# Patient Record
Sex: FEMALE | HISPANIC OR LATINO | Employment: UNEMPLOYED | URBAN - METROPOLITAN AREA
[De-identification: names, ages, dates, MRNs, and addresses within clinical notes are randomized per-mention and may not be internally consistent; named-entity substitution may affect disease eponyms.]

---

## 2018-10-08 ENCOUNTER — HOSPITAL ENCOUNTER (EMERGENCY)
Facility: CLINIC | Age: 38
Discharge: HOME | End: 2018-10-08
Attending: EMERGENCY MEDICINE | Admitting: EMERGENCY MEDICINE
Payer: MEDICAID

## 2018-10-08 VITALS
HEIGHT: 60 IN | TEMPERATURE: 97.9 F | SYSTOLIC BLOOD PRESSURE: 109 MMHG | BODY MASS INDEX: 24.54 KG/M2 | WEIGHT: 125 LBS | DIASTOLIC BLOOD PRESSURE: 69 MMHG | OXYGEN SATURATION: 100 %

## 2018-10-08 DIAGNOSIS — R10.13 ABDOMINAL PAIN, EPIGASTRIC: ICD-10-CM

## 2018-10-08 DIAGNOSIS — K29.00 ACUTE GASTRITIS, PRESENCE OF BLEEDING UNSPECIFIED, UNSPECIFIED GASTRITIS TYPE: ICD-10-CM

## 2018-10-08 LAB
ALBUMIN SERPL-MCNC: 3.9 G/DL (ref 3.4–5)
ALBUMIN UR-MCNC: 10 MG/DL
ALP SERPL-CCNC: 88 U/L (ref 40–150)
ALT SERPL W P-5'-P-CCNC: 22 U/L (ref 0–50)
ANION GAP SERPL CALCULATED.3IONS-SCNC: 6 MMOL/L (ref 3–14)
APPEARANCE UR: CLEAR
AST SERPL W P-5'-P-CCNC: 15 U/L (ref 0–45)
BASOPHILS # BLD AUTO: 0 10E9/L (ref 0–0.2)
BASOPHILS NFR BLD AUTO: 0.1 %
BILIRUB SERPL-MCNC: 0.3 MG/DL (ref 0.2–1.3)
BILIRUB UR QL STRIP: NEGATIVE
BUN SERPL-MCNC: 14 MG/DL (ref 7–30)
CALCIUM SERPL-MCNC: 8.2 MG/DL (ref 8.5–10.1)
CHLORIDE SERPL-SCNC: 104 MMOL/L (ref 94–109)
CO2 SERPL-SCNC: 28 MMOL/L (ref 20–32)
COLOR UR AUTO: YELLOW
CREAT SERPL-MCNC: 0.62 MG/DL (ref 0.52–1.04)
DIFFERENTIAL METHOD BLD: NORMAL
EOSINOPHIL # BLD AUTO: 0.1 10E9/L (ref 0–0.7)
EOSINOPHIL NFR BLD AUTO: 0.6 %
ERYTHROCYTE [DISTWIDTH] IN BLOOD BY AUTOMATED COUNT: 12.2 % (ref 10–15)
GFR SERPL CREATININE-BSD FRML MDRD: >90 ML/MIN/1.7M2
GLUCOSE SERPL-MCNC: 131 MG/DL (ref 70–99)
GLUCOSE UR STRIP-MCNC: NEGATIVE MG/DL
HCG UR QL: NEGATIVE
HCT VFR BLD AUTO: 38.4 % (ref 35–47)
HGB BLD-MCNC: 13.1 G/DL (ref 11.7–15.7)
HGB UR QL STRIP: NEGATIVE
IMM GRANULOCYTES # BLD: 0 10E9/L (ref 0–0.4)
IMM GRANULOCYTES NFR BLD: 0.2 %
INTERPRETATION ECG - MUSE: NORMAL
KETONES UR STRIP-MCNC: NEGATIVE MG/DL
LEUKOCYTE ESTERASE UR QL STRIP: ABNORMAL
LIPASE SERPL-CCNC: 153 U/L (ref 73–393)
LYMPHOCYTES # BLD AUTO: 1.5 10E9/L (ref 0.8–5.3)
LYMPHOCYTES NFR BLD AUTO: 15.1 %
MCH RBC QN AUTO: 30.1 PG (ref 26.5–33)
MCHC RBC AUTO-ENTMCNC: 34.1 G/DL (ref 31.5–36.5)
MCV RBC AUTO: 88 FL (ref 78–100)
MONOCYTES # BLD AUTO: 0.4 10E9/L (ref 0–1.3)
MONOCYTES NFR BLD AUTO: 4.4 %
MUCOUS THREADS #/AREA URNS LPF: PRESENT /LPF
NEUTROPHILS # BLD AUTO: 7.6 10E9/L (ref 1.6–8.3)
NEUTROPHILS NFR BLD AUTO: 79.6 %
NITRATE UR QL: NEGATIVE
NRBC # BLD AUTO: 0 10*3/UL
NRBC BLD AUTO-RTO: 0 /100
PH UR STRIP: 7 PH (ref 5–7)
PLATELET # BLD AUTO: 193 10E9/L (ref 150–450)
POTASSIUM SERPL-SCNC: 3.8 MMOL/L (ref 3.4–5.3)
PROT SERPL-MCNC: 7.3 G/DL (ref 6.8–8.8)
RBC # BLD AUTO: 4.35 10E12/L (ref 3.8–5.2)
RBC #/AREA URNS AUTO: 2 /HPF (ref 0–2)
SODIUM SERPL-SCNC: 138 MMOL/L (ref 133–144)
SOURCE: ABNORMAL
SP GR UR STRIP: 1.03 (ref 1–1.03)
SQUAMOUS #/AREA URNS AUTO: 3 /HPF (ref 0–1)
TROPONIN I SERPL-MCNC: <0.015 UG/L (ref 0–0.04)
UROBILINOGEN UR STRIP-MCNC: NORMAL MG/DL (ref 0–2)
WBC # BLD AUTO: 9.6 10E9/L (ref 4–11)
WBC #/AREA URNS AUTO: 1 /HPF (ref 0–5)

## 2018-10-08 PROCEDURE — 25000132 ZZH RX MED GY IP 250 OP 250 PS 637: Performed by: EMERGENCY MEDICINE

## 2018-10-08 PROCEDURE — 85025 COMPLETE CBC W/AUTO DIFF WBC: CPT | Performed by: EMERGENCY MEDICINE

## 2018-10-08 PROCEDURE — 81001 URINALYSIS AUTO W/SCOPE: CPT | Performed by: EMERGENCY MEDICINE

## 2018-10-08 PROCEDURE — 81025 URINE PREGNANCY TEST: CPT | Performed by: EMERGENCY MEDICINE

## 2018-10-08 PROCEDURE — 80053 COMPREHEN METABOLIC PANEL: CPT | Performed by: EMERGENCY MEDICINE

## 2018-10-08 PROCEDURE — 99284 EMERGENCY DEPT VISIT MOD MDM: CPT | Mod: 25

## 2018-10-08 PROCEDURE — 96374 THER/PROPH/DIAG INJ IV PUSH: CPT

## 2018-10-08 PROCEDURE — 25000125 ZZHC RX 250: Performed by: EMERGENCY MEDICINE

## 2018-10-08 PROCEDURE — 96361 HYDRATE IV INFUSION ADD-ON: CPT

## 2018-10-08 PROCEDURE — 83690 ASSAY OF LIPASE: CPT | Performed by: EMERGENCY MEDICINE

## 2018-10-08 PROCEDURE — 96375 TX/PRO/DX INJ NEW DRUG ADDON: CPT

## 2018-10-08 PROCEDURE — 93005 ELECTROCARDIOGRAM TRACING: CPT

## 2018-10-08 PROCEDURE — 84484 ASSAY OF TROPONIN QUANT: CPT | Performed by: EMERGENCY MEDICINE

## 2018-10-08 PROCEDURE — 25000128 H RX IP 250 OP 636: Performed by: EMERGENCY MEDICINE

## 2018-10-08 RX ORDER — SUCRALFATE ORAL 1 G/10ML
1 SUSPENSION ORAL 4 TIMES DAILY
Qty: 420 ML | Refills: 1 | Status: SHIPPED | OUTPATIENT
Start: 2018-10-08

## 2018-10-08 RX ORDER — ONDANSETRON 4 MG/1
4 TABLET, ORALLY DISINTEGRATING ORAL EVERY 8 HOURS PRN
Qty: 10 TABLET | Refills: 0 | Status: SHIPPED | OUTPATIENT
Start: 2018-10-08

## 2018-10-08 RX ORDER — ONDANSETRON 2 MG/ML
4 INJECTION INTRAMUSCULAR; INTRAVENOUS ONCE
Status: COMPLETED | OUTPATIENT
Start: 2018-10-08 | End: 2018-10-08

## 2018-10-08 RX ADMIN — LIDOCAINE HYDROCHLORIDE 30 ML: 20 SOLUTION ORAL; TOPICAL at 04:04

## 2018-10-08 RX ADMIN — ONDANSETRON 4 MG: 2 INJECTION INTRAMUSCULAR; INTRAVENOUS at 03:45

## 2018-10-08 RX ADMIN — FAMOTIDINE 20 MG: 10 INJECTION, SOLUTION INTRAVENOUS at 04:04

## 2018-10-08 RX ADMIN — SODIUM CHLORIDE 1000 ML: 9 INJECTION, SOLUTION INTRAVENOUS at 03:45

## 2018-10-08 ASSESSMENT — ENCOUNTER SYMPTOMS
SHORTNESS OF BREATH: 0
HEMATURIA: 0
ABDOMINAL PAIN: 1
DIARRHEA: 0
NAUSEA: 1
VOMITING: 1

## 2018-10-08 NOTE — ED PROVIDER NOTES
History     Chief Complaint:  Abdominal Pain and Nausea & Vomiting      HPI   Berenice Borden is a 38 year old female who presents with her  and son for evaluation of abdominal pain, nausea and vomiting. Per the patient's report, she had sharp pain in the pit of her stomach that radiated to her back which started around 11 PM last night. The pain is more in her upper epigastric quadrants but not in her lower abdomen. She notes that she had one episode of emesis and reports that she has not had anything unusual to eat during the day. The patient reports that she has not had any abdominal surgeries or a history of acid reflux or gastritis. She states that she took some ibuprofen but that it did not reduce her pain.   Patient further denies pain when urinating, hematuria, vaginal bleeding, vaginal discharge, chest pain, shortness of breath, or diarrhea. Her last period was on September 24.       Allergies:  No known drug allergies     Medications:    The patient is not currently taking any prescribed medications.       Past Medical History:    The patient is not currently taking any prescribed medications.    Past Surgical History:    History reviewed. No pertinent surgical history.    Family History:    History reviewed. No pertinent family history.     Social History:  Smoking status: Never smoker  Alcohol use: no    Marital Status:   [2]       Review of Systems   Respiratory: Negative for shortness of breath.    Cardiovascular: Negative for chest pain.   Gastrointestinal: Positive for abdominal pain, nausea and vomiting. Negative for diarrhea.   Genitourinary: Negative for hematuria, vaginal bleeding and vaginal discharge.   All other systems reviewed and are negative.        Physical Exam     Patient Vitals for the past 24 hrs:   BP Temp Temp src Heart Rate SpO2 Height Weight   10/08/18 0501 109/69 - - - 100 % - -   10/08/18 0320 126/60 97.9  F (36.6  C) Oral 67 99 % 1.524 m (5') 56.7 kg (125 lb)      Physical Exam  General: Alert, appears well-developed and well-nourished. Cooperative.     In mild distress. [place] speaking  HEENT:  Head:  Atraumatic  Ears:  External ears are normal  Mouth/Throat:  Oropharynx is without erythema or exudate and mucous membranes are moist.   Eyes:   Conjunctivae normal and EOM are normal. No scleral icterus.    Pupils are equal, round, and reactive to light.   CV:  Normal rate, regular rhythm, normal heart sounds and radial pulses are 2+ and symmetric.  No murmur.  Resp:  Breath sounds are clear bilaterally    Non-labored, no retractions or accessory muscle use  GI:  Abdomen is soft, no distension, epigastric tenderness. No rebound or guarding.  No CVA tenderness bilaterally  MS:  Normal range of motion. No edema.    Normal strength in all 4 extremities.     Back atraumatic.    No midline cervical, thoracic, or lumbar tenderness  Skin:  Warm and dry.  No rash or lesions noted.  Neuro: Alert. Normal strength.  GCS: 15  Psych:  Normal mood and affect.    Emergency Department Course   ECG (3:54:54):  Rate 67 bpm. NH interval 150. QRS duration 98. QT/QTc 418/441. P-R-T axes 60 2 -17. Normal sinus rhythm, ow voltage QRS, Incomplete right bundle branch block, T wave abnormality consider inferior ischemia, Abnormal ECG Interpreted at 0357 by Wilfred Martinez MD.    Laboratory:  CMP: Glucose 131 (H), Calcium 8.2 (L), (Creatinine 0.62)  Lipase: 153  CBC: WNL (WBC 9.6, HGB 13.1, )  Troponin: <0.015    UA: Leukocyte esterase Trace, Protein albumin 10, Mucous present, Squamous Epithelial 3 (H), o/w negative  HCG: negative.     Interventions:  0345 NaCl BOLUS 1000 ml IV  0345 Zofran 4 mg IV  0404 GI cocktail 30 ml PO  0404 Pepcid 20 mg IV    Emergency Department Course:  Past medical records, nursing notes, and vitals reviewed.  0341: I performed an exam of the patient and obtained history, as documented above.    IV inserted and blood drawn.    0443: I rechecked the patient and she  was improved. Explained findings to the patient.    0458: I rechecked the patient.  Findings and plan explained to the Patient. Patient discharged home with instructions regarding supportive care, medications, and reasons to return. The importance of close follow-up was reviewed.       Impression & Plan      Medical Decision Making:  Berenice Borden is a 38 year old who presents for evaluation of epigastric abdominal pain and vomiting. I considered a broad differential diagnosis for this patient including gastritis, GERD, cholecystitis, choledocolithiasis, biliary colic, pancreatitis, colonic or small bowel pathology, vascular etiologies including aneurysm, ulcer. Signs and symptoms here are consistent with gastritis. No peritoneal signs. Tolerates PO. Patient experienced relief here with above treatment. There are no signs of any serious etiologies as mentioned above or intraabdominal catastrophes. Doubt perforated ulcer at this point based on exam and history.  No chest pain or SOB, although due to epigastric discomfort, EKG was obtained showing abnormal T wave inversions in inferior leads, although troponin reassuringly negative, low concern for ACS.   Due to improvement with GI medications, follow up with PCP or GI in 7-14 days. Consider endoscopy if further symptoms despite this. Gastritis precautions given for home. Patient will be discharged to home with a prescription for Carafate and omeprazole.    Due to language barrier, an  was present during the history-taking and subsequent discussion (and for part of the physical exam) with this patient.    Diagnosis:    ICD-10-CM    1. Acute gastritis, presence of bleeding unspecified, unspecified gastritis type K29.00 GASTROENTEROLOGY ADULT REF CONSULT ONLY   2. Abdominal pain, epigastric R10.13 GASTROENTEROLOGY ADULT REF CONSULT ONLY       Disposition:  discharged to home    Discharge Medications:  Discharge Medication List as of 10/8/2018  4:56 AM       START taking these medications    Details   omeprazole (PRILOSEC) 20 MG CR capsule Take 1 capsule (20 mg) by mouth daily, Disp-30 capsule, R-0, Local Print      ondansetron (ZOFRAN ODT) 4 MG ODT tab Take 1 tablet (4 mg) by mouth every 8 hours as needed for nausea, Disp-10 tablet, R-0, Local Print      sucralfate (CARAFATE) 1 GM/10ML suspension Take 10 mLs (1 g) by mouth 4 times daily, Disp-420 mL, R-1, Local Print           Mann Meyers  10/8/2018    EMERGENCY DEPARTMENT    Scribe Disclosure:  I, Mann Meyers, am serving as a scribe at 3:41 AM on 10/8/2018 to document services personally performed by Wilfred Martinez MD based on my observations and the provider's statements to me.        Wilfred Martinez MD  10/08/18 0523

## 2018-10-08 NOTE — ED AVS SNAPSHOT
Emergency Department    [address] [place] [id number]    Phone:  [phone]    Fax:  [phone]                                       Berenice Borden   MRN: [id number]    Department:   Emergency Department   Date of Visit:  10/8/2018           After Visit Summary Signature Page     I have received my discharge instructions, and my questions have been answered. I have discussed any challenges I see with this plan with the nurse or doctor.    ..........................................................................................................................................  Patient/Patient Representative Signature      ..........................................................................................................................................  Patient Representative Print Name and Relationship to Patient    ..................................................               ................................................  Date                                   Time    ..........................................................................................................................................  Reviewed by Signature/Title    ...................................................              ..............................................  Date                                               Time          22EPIC Rev 08/18

## 2018-10-08 NOTE — ED AVS SNAPSHOT
Emergency Department    [address] [facility] [address]    Phone:  [phone]    Fax:  [phone]                                       Berenice Borden   MRN: [id number]    Department:   Emergency Department   Date of Visit:  10/8/2018           Patient Information     Date Of Birth          1980        Your diagnoses for this visit were:     Acute gastritis, presence of bleeding unspecified, unspecified gastritis type     Abdominal pain, epigastric        You were seen by Wilfred Martinez MD.      Follow-up Information     Follow up with Your primary care provider. Schedule an appointment as soon as possible for a visit in 3 days.    Why:  for re-assessment from [facility]'s ED visit        Schedule an appointment as soon as possible for a visit with Alan Heller MD.    Specialty:  Gastroenterology    Contact information:    [facility]  [address] [place] [address] [phone]          Follow up with  Emergency Department.    Specialty:  EMERGENCY MEDICINE    Why:  If symptoms worsen    Contact information:    [address] [facility] [address] [phone]        Discharge Instructions         Gastritis  [Gastritis, Adult]    La GASTRITIS es lester irritación del recubrimiento del estómago. Puede ser aguda (reciente) o crónica (de kurt plazo). La puede causar el consumo excesivo de alcohol o medicamentos antiinflamatorios (yasir la aspirina, el ibuprofeno o la prednisona).  La gastritis suele provocar un ardor doloroso (quemazón) en la parte superior del estómago. Otros síntomas incluyen náuseas, vómito, pérdida del apetito, y eruptos o sensación de inflamiento (abotagamiento). La wendy en el vómito o las heces (de color rojizo o negruzco) es lester señal de sangrado en el estómago. [place] requiere atención médica inmediata.  Las pruebas de detección del H. pylori se usan para comprobar si hay lester infección bacteriana. Si no se encuentra lester  infección, la gastritis puede tratarse suspendiendo la causa y tomando antiácidos más un bloqueador de ácido. Si se encuentra la infección por el H. pylori, también se recetarán antibióticos. A las personas de 55 años o más se les podría hacer otras pruebas antes de iniciar el tratamiento.  Se utilizan dos pruebas comunes para evaluar los síntomas. Lester serie gastrointestinal superior que consiste en lester radiografía que se edward después de pierce bebido un líquido de consistencia similar a la tiza (giz) llamado bario. Denisa recubre al estómago y permite que el médico stephan en la radiografía si existe algún problema. Otra prueba se llama endoscopia, en la que un tubo nascimento llamado endoscopio es introducido por la boca y la garganta hasta el estómago para detectar la causa de los síntomas.  Cuidados En [place]:    Valeria todo el ciclo del medicamento bloqueador de ácido aunque antes comience a sentirse mejor. Denisa medicamento puede tardar varios días para controlar por completo los síntomas. Si no puede pagar el medicamento recetado, pruebe анна de los bloqueadores de ácido de venta daysi, tales yasir Pepcid AC, Tagamet, Zantac o Aciphex. Si estos no alivian ailin síntomas, debe probarse un bloqueador de ácido más partha, eleni yasir Prilosec OTC.    Si le ferreira recetado antibióticos para tratar la infección por H. pylori, termine el ciclo completo del medicamento. Hágalo aunque se esté sintiendo mejor. Si usted suspende el medicamento demasiado pronto, la infección puede regresar y ser más difícil de tratar.      Para controlar el dolor, puede usar antiácidos, tales yasir Tums, Rolaids, Mylanta o Maalox. Estos pueden ser útiles los primeros días después de iniciar los bloqueadores de ácido, cuando aún no ferreira comenzado a surtir efecto. Siga las instrucciones de la etiqueta. Los antiácidos líquidos pueden funcionar mejor que los de tableta. Tenga en cuenta que los antiácidos pueden interferir con la absorción de ciertos medicamentos.  Específicamente, no tome Tagamet (cimetidina), Zantac (ranitidina) o Carafate (sucralfato) antes de 1 hora de pierce tomado un antiácido. Consulte con shepard farmacéutico si tiene alguna inquietud.    Los síntomas de la gastritis pueden empeorar si se comen ciertos alimentos. Evite las comidas grasosas, fritas y muy condimentadas, el café, el chocolate, la menta y las comidas con alto contenido de ácido: tomates, cítricos (naranja, margarita, toronja [pomelo]).    Evite el alcohol, la cafeína y el tabaco que pueden retrasar la mejoría.    Evite la aspirina o los medicamentos antiinflamatorios tales yasir ibuprofeno [Advil o Motrin] y naproxeno [Naprosyn o Aleve]. Puede usar acetaminofén [Tylenol] sin problemas. No tome más de la cantidad que se recomienda en la etiqueta.   Programe lester VISITA DE CONTROL con shepard médico o según le indique nuestro personal. Podrían necesitarse otras pruebas. Si no empieza a sentirse mejor en los próximos 4 días, comuníquese con shepard médico. Si le hicieron lester radiografía, lester tomografía computarizada (CT scan) o un electroencefalograma (ECG), carlos será revisado por un especialista. Se le notificará si se encuentra algo nuevo que afecte la atención que recibe.  Busque Prontamente Atención Médica  si algo de lo siguiente ocurre:    El dolor de estómago aumenta o se transfiere al lado derecho inferior del abdomen (kim del apéndice).    Se presenta dolor en el pecho, o si el dolor empeora o se extiende a la espalda, el dolores, el hombro o el brazo.    Vómito frecuente (no puede retener líquidos en el estómago).    Adam en la materia fecal o el vómito (de color negruzco o rojizo).    Se siente débil o mareado, se desmayó o tiene dificultades para respirar.    Fiebre de 100.4 F (38 C) o más liv, o yasir le haya indicado shepard proveedor de atención médica.  Date Last Reviewed: 2/6/2012 2000-2017 [employer] [address] [place] [address], [place] [address]. Todos los derechos reservados. Esta  información no pretende sustituir la atención médica profesional. Sólo shepard médico puede diagnosticar y tratar un problema de toshia.          Tratamiento de la gastritis     [place] ailin medicamentos eleni yasir le hayan indicado, incluso aunque shepard estómago deje de dolerle.     Se hace lester evaluación médica para determinar la causa de los síntomas. La evaluación puede incluir shepard propia historia clínica, lester evaluación física y algunas pruebas. Lester vez hecha la evaluación, comienza el tratamiento, el cual puede consistir en medicamentos y cambios en el estilo de macie. Siga los consejos de shepard médico.  Cómo trisha los medicamentos  El médico puede recetarle ciertos medicamentos para neutralizar o reducir el exceso de ácido del estómago. Si las pruebas indican la presencia de la bacteria H. pilori en el revestimiento del estómago, se pueden recetar antibióticos. H. pylori es lester bacteria que puede causar gastritis.  Evite las siguientes sustancias  Aspirina. Evite trisha aspirina y otros antiinflamatorios, ya que irritan el revestimiento del estómago. Consulte siempre con shepard médico antes de comenzar o discontinuar lester medicación.  Comidas picantes y cafeína. Evite las comidas condimentadas con especias pican'calos, especialmente la darci. La cafeína también puede agravar los síntomas. Por lo tanto, debe evitar el café, el té, las bebidas a base de cola y el chocolate. Asegúrese de informar al médico si hay otras comidas o bebidas que le irriten el estómago.  Tabaco y alcohol. No fume ni aleida alcohol. El tabaco y el alcohol pueden estimular la producción de ácido en el estómago y agravar los síntomas de la gastritis.  Reduzca el estrés  La tensión nerviosa o estrés puede agravar los síntomas de la gastritis. Siempre que pueda, reduzca la cantidad de estrés en shepard macie. Lester manera de lograrlo es comenzar un programa de ejercicio, aiyana consulte jonn con shepard médico. También es importante dormir lo suficiente, al menos 8 horas cada  día.  Date Last Reviewed: 3/7/2014    [id number] [employer] [address] [place] [address], [place] [address]. Todos los derechos reservados. Esta información no pretende sustituir la atención médica profesional. Sólo shepard médico puede diagnosticar y tratar un problema de toshia.          24 Hour Appointment Hotline       To make an appointment at any [facility], call [phone] ([phone]). If you don't have a family doctor or clinic, we will help you find one. [facility] clinics are conveniently located to serve the needs of you and your family.          ED Discharge Orders     GASTROENTEROLOGY ADULT REF CONSULT ONLY       [facility] [phone]      Please be aware that coverage of these services is subject to the terms and limitations of your health insurance plan.  Call member services at your health plan with any benefit or coverage questions.  Any procedures must be performed at a [facility] facility OR coordinated by your clinic's referral office.    Please bring the following with you to your appointment:    (1) Any X-Rays, CTs or MRIs which have been performed.  Contact the facility where they were done to arrange for  prior to your scheduled appointment.    (2) List of current medications   (3) This referral request   (4) Any documents/labs given to you for this referral                     Review of your medicines      START taking        Dose / Directions Last dose taken    omeprazole 20 MG CR capsule   Commonly known as:  priLOSEC   Dose:  20 mg   Quantity:  30 capsule        Take 1 capsule (20 mg) by mouth daily   Refills:  0        ondansetron 4 MG ODT tab   Commonly known as:  ZOFRAN ODT   Dose:  4 mg   Quantity:  10 tablet        Take 1 tablet (4 mg) by mouth every 8 hours as needed for nausea   Refills:  0        sucralfate 1 GM/10ML suspension   Commonly known as:  CARAFATE   Dose:  1 g   Quantity:  420 mL        Take 10 mLs (1 g) by  mouth 4 times daily   Refills:  1                Prescriptions were sent or printed at these locations (3 Prescriptions)                   Other Prescriptions                Printed at Department/Unit printer (3 of 3)         omeprazole (PRILOSEC) 20 MG CR capsule               ondansetron (ZOFRAN ODT) 4 MG ODT tab               sucralfate (CARAFATE) 1 GM/10ML suspension                Procedures and tests performed during your visit     CBC with platelets differential    Comprehensive metabolic panel    EKG 12 lead    HCG qualitative urine (UPT)    Lipase    Troponin I    UA with Microscopic      Orders Needing Specimen Collection     None      Pending Results     No orders found from 10/6/2018 to 10/9/2018.            Pending Culture Results     No orders found from 10/6/2018 to 10/9/2018.            Pending Results Instructions     If you had any lab results that were not finalized at the time of your Discharge, you can call the ED Lab Result RN at [phone]. You will be contacted by this team for any positive Lab results or changes in treatment. The nurses are available 7 days a week from 10A to 6:30P.  You can leave a message 24 hours per day and they will return your call.        Test Results From Your Hospital Stay        10/8/2018  3:59 AM      Component Results     Component Value Ref Range & Units Status    Sodium 138 133 - 144 mmol/L Final    Potassium 3.8 3.4 - 5.3 mmol/L Final    Chloride 104 94 - 109 mmol/L Final    Carbon Dioxide 28 20 - 32 mmol/L Final    Anion Gap 6 3 - 14 mmol/L Final    Glucose 131 (H) 70 - 99 mg/dL Final    Urea Nitrogen 14 7 - 30 mg/dL Final    Creatinine 0.62 0.52 - 1.04 mg/dL Final    GFR Estimate >90 >60 mL/min/1.7m2 Final    Non  GFR Calc    GFR Estimate If Black >90 >60 mL/min/1.7m2 Final    African American GFR Calc    Calcium 8.2 (L) 8.5 - 10.1 mg/dL Final    Bilirubin Total 0.3 0.2 - 1.3 mg/dL Final    Albumin 3.9 3.4 - 5.0 g/dL Final    Protein Total  7.3 6.8 - 8.8 g/dL Final    Alkaline Phosphatase 88 40 - 150 U/L Final    ALT 22 0 - 50 U/L Final    AST 15 0 - 45 U/L Final         10/8/2018  3:56 AM      Component Results     Component Value Ref Range & Units Status    Lipase 153 73 - 393 U/L Final         10/8/2018  3:42 AM      Component Results     Component Value Ref Range & Units Status    WBC 9.6 4.0 - 11.0 10e9/L Final    RBC Count 4.35 3.8 - 5.2 10e12/L Final    Hemoglobin 13.1 11.7 - 15.7 g/dL Final    Hematocrit 38.4 35.0 - 47.0 % Final    MCV 88 78 - 100 fl Final    MCH 30.1 26.5 - 33.0 pg Final    MCHC 34.1 31.5 - 36.5 g/dL Final    RDW 12.2 10.0 - 15.0 % Final    Platelet Count 193 150 - 450 10e9/L Final    Diff Method Automated Method  Final    % Neutrophils 79.6 % Final    % Lymphocytes 15.1 % Final    % Monocytes 4.4 % Final    % Eosinophils 0.6 % Final    % Basophils 0.1 % Final    % Immature Granulocytes 0.2 % Final    Nucleated RBCs 0 0 /100 Final    Absolute Neutrophil 7.6 1.6 - 8.3 10e9/L Final    Absolute Lymphocytes 1.5 0.8 - 5.3 10e9/L Final    Absolute Monocytes 0.4 0.0 - 1.3 10e9/L Final    Absolute Eosinophils 0.1 0.0 - 0.7 10e9/L Final    Absolute Basophils 0.0 0.0 - 0.2 10e9/L Final    Abs Immature Granulocytes 0.0 0 - 0.4 10e9/L Final    Absolute Nucleated RBC 0.0  Final         10/8/2018  4:11 AM      Component Results     Component Value Ref Range & Units Status    Color Urine Yellow  Final    Appearance Urine Clear  Final    Glucose Urine Negative NEG^Negative mg/dL Final    Bilirubin Urine Negative NEG^Negative Final    Ketones Urine Negative NEG^Negative mg/dL Final    Specific Gravity Urine 1.029 1.003 - 1.035 Final    Blood Urine Negative NEG^Negative Final    pH Urine 7.0 5.0 - 7.0 pH Final    Protein Albumin Urine 10 (A) NEG^Negative mg/dL Final    Urobilinogen mg/dL Normal 0.0 - 2.0 mg/dL Final    Nitrite Urine Negative NEG^Negative Final    Leukocyte Esterase Urine Trace (A) NEG^Negative Final    Source Catheterized Urine   Final    WBC Urine 1 0 - 5 /HPF Final    RBC Urine 2 0 - 2 /HPF Final    Squamous Epithelial /HPF Urine 3 (H) 0 - 1 /HPF Final    Mucous Urine Present (A) NEG^Negative /LPF Final         10/8/2018  3:56 AM      Component Results     Component Value Ref Range & Units Status    HCG Qual Urine Negative NEG^Negative Final    This test is for screening purposes.  Results should be interpreted along with   the clinical picture.  Confirmation testing is available if warranted by   ordering PAP422, HCG Quantitative Pregnancy.           10/8/2018  4:22 AM      Component Results     Component Value Ref Range & Units Status    Troponin I ES <0.015 0.000 - 0.045 ug/L Final    The 99th percentile for upper reference range is 0.045 ug/L.  Troponin values   in the range of 0.045 - 0.120 ug/L may be associated with risks of adverse   clinical events.                  Clinical Quality Measure: Blood Pressure Screening     Your blood pressure was checked while you were in the emergency department today. The last reading we obtained was  BP: 126/60 . Please read the guidelines below about what these numbers mean and what you should do about them.  If your systolic blood pressure (the top number) is less than 120 and your diastolic blood pressure (the bottom number) is less than 80, then your blood pressure is normal. There is nothing more that you need to do about it.  If your systolic blood pressure (the top number) is 120-139 or your diastolic blood pressure (the bottom number) is 80-89, your blood pressure may be higher than it should be. You should have your blood pressure rechecked within a year by a primary care provider.  If your systolic blood pressure (the top number) is 140 or greater or your diastolic blood pressure (the bottom number) is 90 or greater, you may have high blood pressure. High blood pressure is treatable, but if left untreated over time it can put you at risk for heart attack, stroke, or kidney failure. You  "should have your blood pressure rechecked by a primary care provider within the next 4 weeks.  If your provider in the emergency department today gave you specific instructions to follow-up with your doctor or provider even sooner than that, you should follow that instruction and not wait for up to 4 weeks for your follow-up visit.        Thank you for choosing [facility]       Thank you for choosing [facility] for your care. Our goal is always to provide you with excellent care. Hearing back from our patients is one way we can continue to improve our services. Please take a few minutes to complete the written survey that you may receive in the mail after you visit with us. Thank you!        [employer] Information     [email or web address] lets you send messages to your doctor, view your test results, renew your prescriptions, schedule appointments and more. To sign up, go to [email or web address] . Click on \"Log in\" on the left side of the screen, which will take you to the Welcome page. Then click on \"Sign up Now\" on the right side of the page.     You will be asked to enter the access code listed below, as well as some personal information. Please follow the directions to create your username and password.     Your access code is: [email or web address]  Expires: 2019  4:55 AM     Your access code will  in 90 days. If you need help or a new code, please call your [facility] clinic or [phone].        Care EveryWhere ID     This is your Care EveryWhere ID. This could be used by other organizations to access your [facility] medical records  [id number]        Equal Access to Services     LETY LANDA : Hadii aad ku hadasho Soomaali, waaxda luqadaha, qaybta kaalmada adeegyada, lucas galeana . So [facility] [phone].    ATENCIÓN: Si habla español, tiene a shepard disposición servicios gratuitos de asistencia lingüística. Llame al [phone].    We comply with applicable federal civil rights laws and [place] " laws. We do not discriminate on the basis of race, color, national origin, age, disability, sex, sexual orientation, or gender identity.            After Visit Summary       This is your record. Keep this with you and show to your community pharmacist(s) and doctor(s) at your next visit.

## 2018-10-08 NOTE — DISCHARGE INSTRUCTIONS
Gastritis  [Gastritis, Adult]    La GASTRITIS es lester irritación del recubrimiento del estómago. Puede ser aguda (reciente) o crónica (de kurt plazo). La puede causar el consumo excesivo de alcohol o medicamentos antiinflamatorios (yasir la aspirina, el ibuprofeno o la prednisona).  La gastritis suele provocar un ardor doloroso (quemazón) en la parte superior del estómago. Otros síntomas incluyen náuseas, vómito, pérdida del apetito, y eruptos o sensación de inflamiento (abotagamiento). La wendy en el vómito o las heces (de color rojizo o negruzco) es lester señal de sangrado en el estómago. [place] requiere atención médica inmediata.  Las pruebas de detección del H. pylori se usan para comprobar si hay lester infección bacteriana. Si no se encuentra lester infección, la gastritis puede tratarse suspendiendo la causa y tomando antiácidos más un bloqueador de ácido. Si se encuentra la infección por el H. pylori, también se recetarán antibióticos. A las personas de 55 años o más se les podría hacer otras pruebas antes de iniciar el tratamiento.  Se utilizan dos pruebas comunes para evaluar los síntomas. Lester serie gastrointestinal superior que consiste en lester radiografía que se edward después de pierce bebido un líquido de consistencia similar a la tiza (giz) llamado bario. Denisa recubre al estómago y permite que el médico stephan en la radiografía si existe algún problema. Otra prueba se llama endoscopia, en la que un tubo nascimento llamado endoscopio es introducido por la boca y la garganta hasta el estómago para detectar la causa de los síntomas.  [place]:    [place] todo el ciclo del medicamento bloqueador de ácido aunque antes comience a sentirse mejor. Denisa medicamento puede tardar varios días para controlar por completo los síntomas. Si no puede pagar el medicamento recetado, pruebe анна de los bloqueadores de ácido de venta daysi, tales yasir Pepcid AC, Tagamet, Zantac o Aciphex. Si estos no alivian ailin síntomas, debe  probarse un bloqueador de ácido más partha, eleni yasir Prilosec OTC.    Si le ferreira recetado antibióticos para tratar la infección por H. pylori, termine el ciclo completo del medicamento. Hágalo aunque se esté sintiendo mejor. Si usted suspende el medicamento demasiado pronto, la infección puede regresar y ser más difícil de tratar.      Para controlar el dolor, puede usar antiácidos, tales yasir Tums, Rolaids, Mylanta o Maalox. Estos pueden ser útiles los primeros días después de iniciar los bloqueadores de ácido, cuando aún no ferreira comenzado a surtir efecto. Siga las instrucciones de la etiqueta. Los antiácidos líquidos pueden funcionar mejor que los de tableta. Tenga en cuenta que los antiácidos pueden interferir con la absorción de ciertos medicamentos. Específicamente, no tome Tagamet (cimetidina), Zantac (ranitidina) o Carafate (sucralfato) antes de 1 hora de pierce tomado un antiácido. Consulte con shepard farmacéutico si tiene alguna inquietud.    Los síntomas de la gastritis pueden empeorar si se comen ciertos alimentos. Evite las comidas grasosas, fritas y muy condimentadas, el café, el chocolate, la menta y las comidas con alto contenido de ácido: tomates, cítricos (naranja, margarita, toronja [pomelo]).    Evite el alcohol, la cafeína y el tabaco que pueden retrasar la mejoría.    Evite la aspirina o los medicamentos antiinflamatorios tales yasir ibuprofeno [Advil o Motrin] y naproxeno [Naprosyn o Aleve]. Puede usar acetaminofén [Tylenol] sin problemas. No tome más de la cantidad que se recomienda en la etiqueta.   Programe lester VISITA DE CONTROL con shepard médico o según le indique nuestro personal. Podrían necesitarse otras pruebas. Si no empieza a sentirse mejor en los próximos 4 días, comuníquese con shepard médico. Si le hicieron lester radiografía, lester tomografía computarizada (CT scan) o un electroencefalograma (ECG), carlos será revisado por un especialista. Se le notificará si se encuentra algo nuevo que afecte la atención  que recibe.  Busque Prontamente Atención Médica  si algo de lo siguiente ocurre:    El dolor de estómago aumenta o se transfiere al lado derecho inferior del abdomen (kim del apéndice).    Se presenta dolor en el pecho, o si el dolor empeora o se extiende a la espalda, el dolores, el hombro o el brazo.    Vómito frecuente (no puede retener líquidos en el estómago).    Adam en la materia fecal o el vómito (de color negruzco o rojizo).    Se siente débil o mareado, se desmayó o tiene dificultades para respirar.    Fiebre de 100.4 F (38 C) o más liv, o yasir le haya indicado shepard proveedor de atención médica.  Date Last Reviewed: 2/6/2012 2000-2017 [employer]. [address] [place] [id number]. Todos los derechos reservados. Esta información no pretende sustituir la atención médica profesional. Sólo shepard médico puede diagnosticar y tratar un problema de toshia.          Tratamiento de la gastritis     [place] ailin medicamentos eleni yasir le hayan indicado, incluso aunque shepard estómago deje de dolerle.     Se hace lester evaluación médica para determinar la causa de los síntomas. La evaluación puede incluir shepard propia historia clínica, lester evaluación física y algunas pruebas. Lester vez hecha la evaluación, comienza el tratamiento, el cual puede consistir en medicamentos y cambios en el estilo de macie. Siga los consejos de shepard médico.  Cómo trisha los medicamentos  El médico puede recetarle ciertos medicamentos para neutralizar o reducir el exceso de ácido del estómago. Si las pruebas indican la presencia de la bacteria H. pilori en el revestimiento del estómago, se pueden recetar antibióticos. H. pylori es lester bacteria que puede causar gastritis.  Evite las siguientes sustancias  Aspirina. Evite trisha aspirina y otros antiinflamatorios, ya que irritan el revestimiento del estómago. Consulte siempre con shepard médico antes de comenzar o discontinuar lester medicación.  Comidas picantes y cafeína. Evite las comidas  condimentadas con especias pican'calos, especialmente la darci. La cafeína también puede agravar los síntomas. Por lo tanto, debe evitar el café, el té, las bebidas a base de cola y el chocolate. Asegúrese de informar al médico si hay otras comidas o bebidas que le irriten el estómago.  Tabaco y alcohol. No fume ni aleida alcohol. El tabaco y el alcohol pueden estimular la producción de ácido en el estómago y agravar los síntomas de la gastritis.  Reduzca el estrés  La tensión nerviosa o estrés puede agravar los síntomas de la gastritis. Siempre que pueda, reduzca la cantidad de estrés en shepard macie. Leyla manera de lograrlo es comenzar un programa de ejercicio, aiyana consulte jonn con hsepard médico. También es importante dormir lo suficiente, al menos 8 horas cada día.  Date Last Reviewed: 3/7/2014    2509-1191 [email or web address]. [address] [place] [address], [place] [address]. Todos los derechos reservados. Esta información no pretende sustituir la atención médica profesional. Sólo shepard médico puede diagnosticar y tratar un problema de toshia.

## 2022-06-13 VITALS
SYSTOLIC BLOOD PRESSURE: 113 MMHG | HEART RATE: 65 BPM | OXYGEN SATURATION: 99 % | RESPIRATION RATE: 18 BRPM | TEMPERATURE: 98.3 F | DIASTOLIC BLOOD PRESSURE: 66 MMHG

## 2022-06-13 LAB
ALBUMIN SERPL-MCNC: 3.7 G/DL (ref 3.4–5)
ALP SERPL-CCNC: 168 U/L (ref 40–150)
ALT SERPL W P-5'-P-CCNC: 81 U/L (ref 0–50)
ANION GAP SERPL CALCULATED.3IONS-SCNC: 7 MMOL/L (ref 3–14)
AST SERPL W P-5'-P-CCNC: 164 U/L (ref 0–45)
BASOPHILS # BLD AUTO: 0 10E3/UL (ref 0–0.2)
BASOPHILS NFR BLD AUTO: 0 %
BILIRUB SERPL-MCNC: 0.4 MG/DL (ref 0.2–1.3)
BUN SERPL-MCNC: 9 MG/DL (ref 7–30)
CALCIUM SERPL-MCNC: 8.4 MG/DL (ref 8.5–10.1)
CHLORIDE BLD-SCNC: 107 MMOL/L (ref 94–109)
CO2 SERPL-SCNC: 25 MMOL/L (ref 20–32)
CREAT SERPL-MCNC: 0.51 MG/DL (ref 0.52–1.04)
EOSINOPHIL # BLD AUTO: 0.1 10E3/UL (ref 0–0.7)
EOSINOPHIL NFR BLD AUTO: 1 %
ERYTHROCYTE [DISTWIDTH] IN BLOOD BY AUTOMATED COUNT: 12.2 % (ref 10–15)
GFR SERPL CREATININE-BSD FRML MDRD: >90 ML/MIN/1.73M2
GLUCOSE BLD-MCNC: 121 MG/DL (ref 70–99)
HCG SERPL QL: NEGATIVE
HCT VFR BLD AUTO: 37.3 % (ref 35–47)
HGB BLD-MCNC: 12.5 G/DL (ref 11.7–15.7)
IMM GRANULOCYTES # BLD: 0 10E3/UL
IMM GRANULOCYTES NFR BLD: 0 %
LIPASE SERPL-CCNC: 176 U/L (ref 73–393)
LYMPHOCYTES # BLD AUTO: 1.7 10E3/UL (ref 0.8–5.3)
LYMPHOCYTES NFR BLD AUTO: 18 %
MCH RBC QN AUTO: 29.5 PG (ref 26.5–33)
MCHC RBC AUTO-ENTMCNC: 33.5 G/DL (ref 31.5–36.5)
MCV RBC AUTO: 88 FL (ref 78–100)
MONOCYTES # BLD AUTO: 0.4 10E3/UL (ref 0–1.3)
MONOCYTES NFR BLD AUTO: 5 %
NEUTROPHILS # BLD AUTO: 7.2 10E3/UL (ref 1.6–8.3)
NEUTROPHILS NFR BLD AUTO: 76 %
NRBC # BLD AUTO: 0 10E3/UL
NRBC BLD AUTO-RTO: 0 /100
PLATELET # BLD AUTO: 256 10E3/UL (ref 150–450)
POTASSIUM BLD-SCNC: 3.5 MMOL/L (ref 3.4–5.3)
PROT SERPL-MCNC: 7.1 G/DL (ref 6.8–8.8)
RBC # BLD AUTO: 4.24 10E6/UL (ref 3.8–5.2)
SODIUM SERPL-SCNC: 139 MMOL/L (ref 133–144)
WBC # BLD AUTO: 9.5 10E3/UL (ref 4–11)

## 2022-06-13 PROCEDURE — 83690 ASSAY OF LIPASE: CPT | Performed by: EMERGENCY MEDICINE

## 2022-06-13 PROCEDURE — 93005 ELECTROCARDIOGRAM TRACING: CPT | Mod: RTG

## 2022-06-13 PROCEDURE — 85025 COMPLETE CBC W/AUTO DIFF WBC: CPT | Performed by: EMERGENCY MEDICINE

## 2022-06-13 PROCEDURE — 999N000104 HC STATISTIC NO CHARGE

## 2022-06-13 PROCEDURE — 36415 COLL VENOUS BLD VENIPUNCTURE: CPT | Performed by: EMERGENCY MEDICINE

## 2022-06-13 PROCEDURE — 84703 CHORIONIC GONADOTROPIN ASSAY: CPT | Performed by: EMERGENCY MEDICINE

## 2022-06-13 PROCEDURE — 80053 COMPREHEN METABOLIC PANEL: CPT | Performed by: EMERGENCY MEDICINE

## 2022-06-14 ENCOUNTER — HOSPITAL ENCOUNTER (EMERGENCY)
Facility: CLINIC | Age: 42
Discharge: LEFT BEFORE DISCHARGE | End: 2022-06-14
Admitting: EMERGENCY MEDICINE
Payer: MEDICAID

## 2022-06-14 LAB
ATRIAL RATE - MUSE: 65 BPM
DIASTOLIC BLOOD PRESSURE - MUSE: NORMAL MMHG
INTERPRETATION ECG - MUSE: NORMAL
P AXIS - MUSE: 62 DEGREES
PR INTERVAL - MUSE: 158 MS
QRS DURATION - MUSE: 98 MS
QT - MUSE: 414 MS
QTC - MUSE: 430 MS
R AXIS - MUSE: 17 DEGREES
SYSTOLIC BLOOD PRESSURE - MUSE: NORMAL MMHG
T AXIS - MUSE: 18 DEGREES
TROPONIN I SERPL HS-MCNC: <3 NG/L
VENTRICULAR RATE- MUSE: 65 BPM

## 2022-06-14 PROCEDURE — 36415 COLL VENOUS BLD VENIPUNCTURE: CPT | Performed by: EMERGENCY MEDICINE

## 2022-06-14 PROCEDURE — 84484 ASSAY OF TROPONIN QUANT: CPT | Performed by: EMERGENCY MEDICINE

## 2022-06-14 NOTE — ED NOTES
Patient does not want to wait to be seen.  Instructed to return for any new or worsening symptoms & to follow-up with PMD.  Left per pedis.  Gait steady.     Patient stated intent to leave without being seen after [place] results [place] [email or web address].  Patient informed that though they received the results, they still need to be seen by a provider, since they are preliminary results, and the provider may need to order more tests to better evaluate the patient's medical complaint. Patient stated they understood there are risks of leaving prior to being seen.